# Patient Record
(demographics unavailable — no encounter records)

---

## 2025-03-09 NOTE — HISTORY OF PRESENT ILLNESS
[de-identified] : fever/congestion  [FreeTextEntry6] : Presents with c/o cough/congestion last night.  Low grade fever last night Tmax 100.2 this morning - gave Ibuprofen.  Responds well to the med - feels better after taking it.  Appetite/activity at baseline, drinking well, good UO.  No vomiting/No diarrhea.   + school/sick contacts/Sibling FluB positive last week - they share a room.

## 2025-03-09 NOTE — DISCUSSION/SUMMARY
[FreeTextEntry1] :  7 year old boy (+) influenza B, well hydrated, in no distress.  Recommend supportive care including antipyretics, fluids, and nasal saline followed by nasal suction.  May use age-appropriate OTC cough meds PRN.  Discussed risks/benefits of Tamiflu -- Mom wishes to start - sent to pharmacy, side effects reviewed - if any seen will d/c immediately and call office.  Prevention of spread reviewed.  Good hydration and hand hygiene discussed.  RED FLAGS REVIEWED - indications for ED eval discussed, signs of distress/dehydration/secondary infection reviewed - .mom agrees with plan, demonstrates an understanding, is able to repeat back instructions and has no questions at this time.  Return sooner PRN.  Well care as scheduled.

## 2025-03-09 NOTE — PHYSICAL EXAM
[NL] : warm, clear [Lethargic] : not lethargic [Clear Rhinorrhea] : clear rhinorrhea [Wheezing] : no wheezing [Subcostal Retractions] : no subcostal retractions [Suprasternal Retractions] : no suprasternal retractions [Anterior Cervical] : anterior cervical [FreeTextEntry5] : Glassy

## 2025-03-09 NOTE — HISTORY OF PRESENT ILLNESS
[de-identified] : fever/congestion  [FreeTextEntry6] : Presents with c/o cough/congestion last night.  Low grade fever last night Tmax 100.2 this morning - gave Ibuprofen.  Responds well to the med - feels better after taking it.  Appetite/activity at baseline, drinking well, good UO.  No vomiting/No diarrhea.   + school/sick contacts/Sibling FluB positive last week - they share a room.